# Patient Record
Sex: FEMALE | Race: ASIAN | NOT HISPANIC OR LATINO | URBAN - METROPOLITAN AREA
[De-identification: names, ages, dates, MRNs, and addresses within clinical notes are randomized per-mention and may not be internally consistent; named-entity substitution may affect disease eponyms.]

---

## 2019-06-16 ENCOUNTER — INPATIENT (INPATIENT)
Facility: HOSPITAL | Age: 34
LOS: 2 days | Discharge: HOME | End: 2019-06-19
Attending: OBSTETRICS & GYNECOLOGY | Admitting: OBSTETRICS & GYNECOLOGY
Payer: MEDICAID

## 2019-06-16 VITALS
TEMPERATURE: 97 F | HEART RATE: 67 BPM | RESPIRATION RATE: 18 BRPM | SYSTOLIC BLOOD PRESSURE: 151 MMHG | OXYGEN SATURATION: 100 % | DIASTOLIC BLOOD PRESSURE: 84 MMHG

## 2019-06-16 DIAGNOSIS — O26.899 OTHER SPECIFIED PREGNANCY RELATED CONDITIONS, UNSPECIFIED TRIMESTER: ICD-10-CM

## 2019-06-16 DIAGNOSIS — Z3A.00 WEEKS OF GESTATION OF PREGNANCY NOT SPECIFIED: ICD-10-CM

## 2019-06-16 DIAGNOSIS — Z34.80 ENCOUNTER FOR SUPERVISION OF OTHER NORMAL PREGNANCY, UNSPECIFIED TRIMESTER: ICD-10-CM

## 2019-06-16 LAB
ABO RH CONFIRMATION: SIGNIFICANT CHANGE UP
ALLERGY+IMMUNOLOGY DIAG STUDY NOTE: ABNORMAL
ALLERGY+IMMUNOLOGY DIAG STUDY NOTE: SIGNIFICANT CHANGE UP
ANISOCYTOSIS BLD QL: SLIGHT — SIGNIFICANT CHANGE UP
APTT BLD: 22.9 SEC — LOW (ref 27.5–36.3)
BASOPHILS # BLD AUTO: 0 K/UL — SIGNIFICANT CHANGE UP (ref 0–0.2)
BASOPHILS NFR BLD AUTO: 0 % — SIGNIFICANT CHANGE UP (ref 0–2)
DIR ANTIGLOB POLYSPECIFIC INTERPRETATION: SIGNIFICANT CHANGE UP
EOSINOPHIL # BLD AUTO: 0.16 K/UL — SIGNIFICANT CHANGE UP (ref 0–0.5)
EOSINOPHIL NFR BLD AUTO: 2 % — SIGNIFICANT CHANGE UP (ref 0–6)
FIBRINOGEN PPP-MCNC: 685 MG/DL — HIGH (ref 350–510)
HBV SURFACE AG SERPL QL IA: SIGNIFICANT CHANGE UP
HCT VFR BLD CALC: 34.3 % — LOW (ref 34.5–45)
HGB BLD-MCNC: 10.7 G/DL — LOW (ref 11.5–15.5)
INR BLD: 0.94 RATIO — SIGNIFICANT CHANGE UP (ref 0.88–1.16)
LYMPHOCYTES # BLD AUTO: 2.26 K/UL — SIGNIFICANT CHANGE UP (ref 1–3.3)
LYMPHOCYTES # BLD AUTO: 28 % — SIGNIFICANT CHANGE UP (ref 13–44)
MANUAL SMEAR VERIFICATION: SIGNIFICANT CHANGE UP
MCHC RBC-ENTMCNC: 25.4 PG — LOW (ref 27–34)
MCHC RBC-ENTMCNC: 31.2 GM/DL — LOW (ref 32–36)
MCV RBC AUTO: 81.5 FL — SIGNIFICANT CHANGE UP (ref 80–100)
MICROCYTES BLD QL: SLIGHT — SIGNIFICANT CHANGE UP
MONOCYTES # BLD AUTO: 0.16 K/UL — SIGNIFICANT CHANGE UP (ref 0–0.9)
MONOCYTES NFR BLD AUTO: 2 % — SIGNIFICANT CHANGE UP (ref 2–14)
NEUTROPHILS # BLD AUTO: 5.08 K/UL — SIGNIFICANT CHANGE UP (ref 1.8–7.4)
NEUTROPHILS NFR BLD AUTO: 63 % — SIGNIFICANT CHANGE UP (ref 43–77)
NRBC # BLD: 0 /100 — SIGNIFICANT CHANGE UP (ref 0–0)
OVALOCYTES BLD QL SMEAR: SLIGHT — SIGNIFICANT CHANGE UP
PLAT MORPH BLD: NORMAL — SIGNIFICANT CHANGE UP
PLATELET # BLD AUTO: 243 K/UL — SIGNIFICANT CHANGE UP (ref 150–400)
POIKILOCYTOSIS BLD QL AUTO: SLIGHT — SIGNIFICANT CHANGE UP
PROTHROM AB SERPL-ACNC: 10.4 SEC — SIGNIFICANT CHANGE UP (ref 10–12.9)
RBC # BLD: 4.21 M/UL — SIGNIFICANT CHANGE UP (ref 3.8–5.2)
RBC # FLD: 22.2 % — HIGH (ref 10.3–14.5)
RBC BLD AUTO: ABNORMAL
SPHEROCYTES BLD QL SMEAR: SLIGHT — SIGNIFICANT CHANGE UP
T PALLIDUM AB TITR SER: NEGATIVE — SIGNIFICANT CHANGE UP
VARIANT LYMPHS # BLD: 5 % — SIGNIFICANT CHANGE UP (ref 0–6)
WBC # BLD: 8.06 K/UL — SIGNIFICANT CHANGE UP (ref 3.8–10.5)
WBC # FLD AUTO: 8.06 K/UL — SIGNIFICANT CHANGE UP (ref 3.8–10.5)

## 2019-06-16 PROCEDURE — 59514 CESAREAN DELIVERY ONLY: CPT | Mod: AS,U7

## 2019-06-16 PROCEDURE — 99282 EMERGENCY DEPT VISIT SF MDM: CPT

## 2019-06-16 PROCEDURE — 88307 TISSUE EXAM BY PATHOLOGIST: CPT | Mod: 26

## 2019-06-16 RX ORDER — METOCLOPRAMIDE HCL 10 MG
10 TABLET ORAL ONCE
Refills: 0 | Status: DISCONTINUED | OUTPATIENT
Start: 2019-06-16 | End: 2019-06-16

## 2019-06-16 RX ORDER — DOCUSATE SODIUM 100 MG
100 CAPSULE ORAL
Refills: 0 | Status: DISCONTINUED | OUTPATIENT
Start: 2019-06-16 | End: 2019-06-19

## 2019-06-16 RX ORDER — OXYTOCIN 10 UNIT/ML
333.33 VIAL (ML) INJECTION
Qty: 20 | Refills: 0 | Status: DISCONTINUED | OUTPATIENT
Start: 2019-06-16 | End: 2019-06-19

## 2019-06-16 RX ORDER — SIMETHICONE 80 MG/1
80 TABLET, CHEWABLE ORAL EVERY 4 HOURS
Refills: 0 | Status: DISCONTINUED | OUTPATIENT
Start: 2019-06-16 | End: 2019-06-19

## 2019-06-16 RX ORDER — CITRIC ACID/SODIUM CITRATE 300-500 MG
30 SOLUTION, ORAL ORAL ONCE
Refills: 0 | Status: COMPLETED | OUTPATIENT
Start: 2019-06-16 | End: 2019-06-16

## 2019-06-16 RX ORDER — KETOROLAC TROMETHAMINE 30 MG/ML
30 SYRINGE (ML) INJECTION EVERY 6 HOURS
Refills: 0 | Status: DISCONTINUED | OUTPATIENT
Start: 2019-06-16 | End: 2019-06-17

## 2019-06-16 RX ORDER — HEPARIN SODIUM 5000 [USP'U]/ML
5000 INJECTION INTRAVENOUS; SUBCUTANEOUS EVERY 12 HOURS
Refills: 0 | Status: DISCONTINUED | OUTPATIENT
Start: 2019-06-17 | End: 2019-06-19

## 2019-06-16 RX ORDER — MAGNESIUM HYDROXIDE 400 MG/1
30 TABLET, CHEWABLE ORAL
Refills: 0 | Status: DISCONTINUED | OUTPATIENT
Start: 2019-06-16 | End: 2019-06-19

## 2019-06-16 RX ORDER — SODIUM CHLORIDE 9 MG/ML
1000 INJECTION, SOLUTION INTRAVENOUS
Refills: 0 | Status: DISCONTINUED | OUTPATIENT
Start: 2019-06-16 | End: 2019-06-17

## 2019-06-16 RX ORDER — LANOLIN
1 OINTMENT (GRAM) TOPICAL EVERY 6 HOURS
Refills: 0 | Status: DISCONTINUED | OUTPATIENT
Start: 2019-06-16 | End: 2019-06-19

## 2019-06-16 RX ORDER — ONDANSETRON 8 MG/1
4 TABLET, FILM COATED ORAL ONCE
Refills: 0 | Status: DISCONTINUED | OUTPATIENT
Start: 2019-06-16 | End: 2019-06-19

## 2019-06-16 RX ORDER — CEFAZOLIN SODIUM 1 G
2000 VIAL (EA) INJECTION ONCE
Refills: 0 | Status: COMPLETED | OUTPATIENT
Start: 2019-06-16 | End: 2019-06-16

## 2019-06-16 RX ORDER — SODIUM CHLORIDE 9 MG/ML
1000 INJECTION, SOLUTION INTRAVENOUS
Refills: 0 | Status: DISCONTINUED | OUTPATIENT
Start: 2019-06-16 | End: 2019-06-16

## 2019-06-16 RX ORDER — DIPHENHYDRAMINE HCL 50 MG
25 CAPSULE ORAL EVERY 6 HOURS
Refills: 0 | Status: DISCONTINUED | OUTPATIENT
Start: 2019-06-16 | End: 2019-06-19

## 2019-06-16 RX ORDER — GLYCERIN ADULT
1 SUPPOSITORY, RECTAL RECTAL AT BEDTIME
Refills: 0 | Status: DISCONTINUED | OUTPATIENT
Start: 2019-06-16 | End: 2019-06-19

## 2019-06-16 RX ORDER — SODIUM CHLORIDE 9 MG/ML
1000 INJECTION, SOLUTION INTRAVENOUS ONCE
Refills: 0 | Status: COMPLETED | OUTPATIENT
Start: 2019-06-16 | End: 2019-06-16

## 2019-06-16 RX ORDER — AZITHROMYCIN 500 MG/1
500 TABLET, FILM COATED ORAL ONCE
Refills: 0 | Status: COMPLETED | OUTPATIENT
Start: 2019-06-16 | End: 2019-06-16

## 2019-06-16 RX ORDER — TETANUS TOXOID, REDUCED DIPHTHERIA TOXOID AND ACELLULAR PERTUSSIS VACCINE, ADSORBED 5; 2.5; 8; 8; 2.5 [IU]/.5ML; [IU]/.5ML; UG/.5ML; UG/.5ML; UG/.5ML
0.5 SUSPENSION INTRAMUSCULAR ONCE
Refills: 0 | Status: DISCONTINUED | OUTPATIENT
Start: 2019-06-16 | End: 2019-06-19

## 2019-06-16 RX ORDER — ACETAMINOPHEN 500 MG
1000 TABLET ORAL ONCE
Refills: 0 | Status: COMPLETED | OUTPATIENT
Start: 2019-06-16 | End: 2019-06-17

## 2019-06-16 RX ADMIN — SODIUM CHLORIDE 125 MILLILITER(S): 9 INJECTION, SOLUTION INTRAVENOUS at 18:39

## 2019-06-16 RX ADMIN — Medication 1000 MILLIUNIT(S)/MIN: at 13:22

## 2019-06-16 RX ADMIN — SODIUM CHLORIDE 125 MILLILITER(S): 9 INJECTION, SOLUTION INTRAVENOUS at 10:00

## 2019-06-16 RX ADMIN — SODIUM CHLORIDE 2000 MILLILITER(S): 9 INJECTION, SOLUTION INTRAVENOUS at 09:00

## 2019-06-16 RX ADMIN — Medication 30 MILLILITER(S): at 11:22

## 2019-06-16 RX ADMIN — AZITHROMYCIN 250 MILLIGRAM(S): 500 TABLET, FILM COATED ORAL at 12:21

## 2019-06-16 RX ADMIN — Medication 100 MILLIGRAM(S): at 12:21

## 2019-06-16 NOTE — PATIENT PROFILE OB - ALERT: PERTINENT HISTORY
Fetal Sonogram/Ultra Screen at 12 Weeks/20 Week Level II Sonogram/BioPhysical Profile(s)/1st Trimester Sonogram/Follow up Sonogram for Growth

## 2019-06-16 NOTE — PROGRESS NOTE ADULT - SUBJECTIVE AND OBJECTIVE BOX
Post Op Note    Patient seen resting comfortably.  No new complaints.  Denies HA, CP, SOB, N/V/D, dizziness, palpitations, worsening abdominal pain, worsening vaginal bleeding, or any other concerns. Tolerating clear diet at this time.  Attempting breastfeeding.      Vital Signs Last 24 Hrs  T(C): 36.6 (2019 16:27), Max: 36.6 (2019 16:27)  T(F): 97.8 (2019 16:27), Max: 97.8 (2019 16:27)  HR: 69 (2019 16:27) (65 - 92)  BP: 93/60 (2019 16:27) (85/55 - 151/84)  RR: 18 (2019 16:27) (16 - 19)  SpO2: 97% (2019 16:27) (97% - 100%)    Gen: A&O x 3, NAD  Chest: CTABL  Cardiac: S1+S2+ RRR  Breast: Soft, nontender, nonengorged  Abdomen: Nontender, nondistended, +BS, Dressing C/D/I  Gyn: Minimal bleeding  Extremities: Nontender, DTRS 2+, no worsening edema, venodynes intact                          10.7   8.06  )-----------( 243      ( 2019 09:31 )             34.3       A/P:  Patient s/p  section, POD #0.     -Pain management as needed  -Advance as per protocol  -OOB and ambulate in am   -Repeat CBC in am  -DC li and trial of void in am  -Advance diet with flatus  -Encourage breastfeeding

## 2019-06-17 LAB
BASOPHILS # BLD AUTO: 0.03 K/UL — SIGNIFICANT CHANGE UP (ref 0–0.2)
BASOPHILS NFR BLD AUTO: 0.3 % — SIGNIFICANT CHANGE UP (ref 0–2)
EOSINOPHIL # BLD AUTO: 0.01 K/UL — SIGNIFICANT CHANGE UP (ref 0–0.5)
EOSINOPHIL NFR BLD AUTO: 0.1 % — SIGNIFICANT CHANGE UP (ref 0–6)
HCT VFR BLD CALC: 27.7 % — LOW (ref 34.5–45)
HGB BLD-MCNC: 8.8 G/DL — LOW (ref 11.5–15.5)
IMM GRANULOCYTES NFR BLD AUTO: 0.4 % — SIGNIFICANT CHANGE UP (ref 0–1.5)
LYMPHOCYTES # BLD AUTO: 1.07 K/UL — SIGNIFICANT CHANGE UP (ref 1–3.3)
LYMPHOCYTES # BLD AUTO: 9.5 % — LOW (ref 13–44)
MCHC RBC-ENTMCNC: 25.7 PG — LOW (ref 27–34)
MCHC RBC-ENTMCNC: 31.8 GM/DL — LOW (ref 32–36)
MCV RBC AUTO: 81 FL — SIGNIFICANT CHANGE UP (ref 80–100)
MONOCYTES # BLD AUTO: 0.64 K/UL — SIGNIFICANT CHANGE UP (ref 0–0.9)
MONOCYTES NFR BLD AUTO: 5.7 % — SIGNIFICANT CHANGE UP (ref 2–14)
NEUTROPHILS # BLD AUTO: 9.42 K/UL — HIGH (ref 1.8–7.4)
NEUTROPHILS NFR BLD AUTO: 84 % — HIGH (ref 43–77)
NRBC # BLD: 0 /100 WBCS — SIGNIFICANT CHANGE UP (ref 0–0)
PLATELET # BLD AUTO: 263 K/UL — SIGNIFICANT CHANGE UP (ref 150–400)
RBC # BLD: 3.42 M/UL — LOW (ref 3.8–5.2)
RBC # FLD: 22.2 % — HIGH (ref 10.3–14.5)
WBC # BLD: 11.21 K/UL — HIGH (ref 3.8–10.5)
WBC # FLD AUTO: 11.21 K/UL — HIGH (ref 3.8–10.5)

## 2019-06-17 RX ORDER — ACETAMINOPHEN 500 MG
975 TABLET ORAL EVERY 6 HOURS
Refills: 0 | Status: DISCONTINUED | OUTPATIENT
Start: 2019-06-17 | End: 2019-06-19

## 2019-06-17 RX ORDER — IBUPROFEN 200 MG
600 TABLET ORAL EVERY 6 HOURS
Refills: 0 | Status: DISCONTINUED | OUTPATIENT
Start: 2019-06-17 | End: 2019-06-17

## 2019-06-17 RX ORDER — FERROUS SULFATE 325(65) MG
325 TABLET ORAL
Refills: 0 | Status: DISCONTINUED | OUTPATIENT
Start: 2019-06-17 | End: 2019-06-19

## 2019-06-17 RX ORDER — IBUPROFEN 200 MG
600 TABLET ORAL EVERY 6 HOURS
Refills: 0 | Status: DISCONTINUED | OUTPATIENT
Start: 2019-06-17 | End: 2019-06-19

## 2019-06-17 RX ORDER — ASCORBIC ACID 60 MG
500 TABLET,CHEWABLE ORAL DAILY
Refills: 0 | Status: DISCONTINUED | OUTPATIENT
Start: 2019-06-17 | End: 2019-06-19

## 2019-06-17 RX ADMIN — Medication 600 MILLIGRAM(S): at 18:46

## 2019-06-17 RX ADMIN — Medication 975 MILLIGRAM(S): at 13:01

## 2019-06-17 RX ADMIN — HEPARIN SODIUM 5000 UNIT(S): 5000 INJECTION INTRAVENOUS; SUBCUTANEOUS at 12:31

## 2019-06-17 RX ADMIN — Medication 975 MILLIGRAM(S): at 18:17

## 2019-06-17 RX ADMIN — Medication 975 MILLIGRAM(S): at 18:47

## 2019-06-17 RX ADMIN — Medication 1 TABLET(S): at 12:30

## 2019-06-17 RX ADMIN — Medication 500 MILLIGRAM(S): at 12:30

## 2019-06-17 RX ADMIN — Medication 325 MILLIGRAM(S): at 18:17

## 2019-06-17 RX ADMIN — Medication 600 MILLIGRAM(S): at 12:30

## 2019-06-17 RX ADMIN — Medication 400 MILLIGRAM(S): at 05:45

## 2019-06-17 RX ADMIN — Medication 600 MILLIGRAM(S): at 18:16

## 2019-06-17 RX ADMIN — Medication 975 MILLIGRAM(S): at 12:31

## 2019-06-17 RX ADMIN — Medication 600 MILLIGRAM(S): at 01:30

## 2019-06-17 RX ADMIN — HEPARIN SODIUM 5000 UNIT(S): 5000 INJECTION INTRAVENOUS; SUBCUTANEOUS at 01:27

## 2019-06-17 NOTE — PROGRESS NOTE ADULT - SUBJECTIVE AND OBJECTIVE BOX
Patient seen at [facility] resting comfortably offers no new complaints. + Ambulation, voiding without difficulty, + flatus; tolerating regular diet. both breast and bottle feeding. Denies HA, CP, SOB, N/V/D,  no bm; dizziness, palpitations, worsening abdominal pain, worsening vaginal bleeding, or any other concerns.     Vital Signs Last 24 Hrs  T(C): 37.1 (17 Jun 2019 06:00), Max: 37.1 (17 Jun 2019 00:30)  T(F): 98.8 (17 Jun 2019 06:00), Max: 98.8 (17 Jun 2019 06:00)  HR: 73 (17 Jun 2019 06:00) (65 - 97)  BP: 95/62 (17 Jun 2019 06:00) (85/55 - 151/84)  BP(mean): --  RR: 18 (17 Jun 2019 06:00) (17 - 19)  SpO2: 96% (17 Jun 2019 06:00) (96% - 100%)    Gen: A&O x 3, NAD  Chest: CTA B/L  Cardiac: S1,S2  RRR  Breast: Soft, nontender, nonengorged  Abdomen: +BS; soft; Nontender, nondistended; dressing removed incision C/D/I  Gyn: minimal lochia   Extremities: Nontender, no worsening edema;                           10.7   8.06  )-----------( 243      ( 16 Jun 2019 09:31 )             34.3       A/P: POD #1 s/p c/s  --Pain management as needed  -Advance as per protocol  -OOB and ambulate  -f/u Rpt CBC   -DC li f/u void  -Advance diet with flatus  -Encourage breastfeeding   -d/w rell aguilar

## 2019-06-17 NOTE — PROGRESS NOTE ADULT - PROBLEM SELECTOR PLAN 1
A/P: POD #1 s/p c/s  --Pain management as needed  -Advance as per protocol  -OOB and ambulate  -f/u Rpt CBC   -DC li f/u void  -Advance diet with flatus  -Encourage breastfeeding   -d/w rell aguilar

## 2019-06-18 DIAGNOSIS — D64.9 ANEMIA, UNSPECIFIED: ICD-10-CM

## 2019-06-18 LAB
BASOPHILS # BLD AUTO: 0.03 K/UL — SIGNIFICANT CHANGE UP (ref 0–0.2)
BASOPHILS NFR BLD AUTO: 0.2 % — SIGNIFICANT CHANGE UP (ref 0–2)
EOSINOPHIL # BLD AUTO: 0.06 K/UL — SIGNIFICANT CHANGE UP (ref 0–0.5)
EOSINOPHIL NFR BLD AUTO: 0.5 % — SIGNIFICANT CHANGE UP (ref 0–6)
HCT VFR BLD CALC: 27.5 % — LOW (ref 34.5–45)
HGB BLD-MCNC: 8.4 G/DL — LOW (ref 11.5–15.5)
IMM GRANULOCYTES NFR BLD AUTO: 0.4 % — SIGNIFICANT CHANGE UP (ref 0–1.5)
LYMPHOCYTES # BLD AUTO: 1.75 K/UL — SIGNIFICANT CHANGE UP (ref 1–3.3)
LYMPHOCYTES # BLD AUTO: 14.1 % — SIGNIFICANT CHANGE UP (ref 13–44)
MCHC RBC-ENTMCNC: 25.1 PG — LOW (ref 27–34)
MCHC RBC-ENTMCNC: 30.5 GM/DL — LOW (ref 32–36)
MCV RBC AUTO: 82.3 FL — SIGNIFICANT CHANGE UP (ref 80–100)
MONOCYTES # BLD AUTO: 0.97 K/UL — HIGH (ref 0–0.9)
MONOCYTES NFR BLD AUTO: 7.8 % — SIGNIFICANT CHANGE UP (ref 2–14)
NEUTROPHILS # BLD AUTO: 9.53 K/UL — HIGH (ref 1.8–7.4)
NEUTROPHILS NFR BLD AUTO: 77 % — SIGNIFICANT CHANGE UP (ref 43–77)
NRBC # BLD: 0 /100 WBCS — SIGNIFICANT CHANGE UP (ref 0–0)
PLATELET # BLD AUTO: 273 K/UL — SIGNIFICANT CHANGE UP (ref 150–400)
RBC # BLD: 3.34 M/UL — LOW (ref 3.8–5.2)
RBC # FLD: 22.9 % — HIGH (ref 10.3–14.5)
WBC # BLD: 12.39 K/UL — HIGH (ref 3.8–10.5)
WBC # FLD AUTO: 12.39 K/UL — HIGH (ref 3.8–10.5)

## 2019-06-18 RX ORDER — FOLIC ACID 0.8 MG
1 TABLET ORAL DAILY
Refills: 0 | Status: DISCONTINUED | OUTPATIENT
Start: 2019-06-18 | End: 2019-06-19

## 2019-06-18 RX ADMIN — Medication 500 MILLIGRAM(S): at 12:26

## 2019-06-18 RX ADMIN — Medication 975 MILLIGRAM(S): at 06:06

## 2019-06-18 RX ADMIN — MAGNESIUM HYDROXIDE 30 MILLILITER(S): 400 TABLET, CHEWABLE ORAL at 18:52

## 2019-06-18 RX ADMIN — Medication 100 MILLIGRAM(S): at 18:52

## 2019-06-18 RX ADMIN — Medication 600 MILLIGRAM(S): at 12:47

## 2019-06-18 RX ADMIN — Medication 975 MILLIGRAM(S): at 23:56

## 2019-06-18 RX ADMIN — Medication 975 MILLIGRAM(S): at 19:11

## 2019-06-18 RX ADMIN — Medication 975 MILLIGRAM(S): at 00:17

## 2019-06-18 RX ADMIN — Medication 975 MILLIGRAM(S): at 18:52

## 2019-06-18 RX ADMIN — Medication 975 MILLIGRAM(S): at 12:26

## 2019-06-18 RX ADMIN — Medication 975 MILLIGRAM(S): at 12:46

## 2019-06-18 RX ADMIN — HEPARIN SODIUM 5000 UNIT(S): 5000 INJECTION INTRAVENOUS; SUBCUTANEOUS at 18:52

## 2019-06-18 RX ADMIN — Medication 1 TABLET(S): at 12:26

## 2019-06-18 RX ADMIN — Medication 600 MILLIGRAM(S): at 12:26

## 2019-06-18 RX ADMIN — Medication 600 MILLIGRAM(S): at 06:06

## 2019-06-18 RX ADMIN — Medication 600 MILLIGRAM(S): at 00:17

## 2019-06-18 RX ADMIN — Medication 600 MILLIGRAM(S): at 23:56

## 2019-06-18 RX ADMIN — Medication 325 MILLIGRAM(S): at 18:52

## 2019-06-18 RX ADMIN — Medication 1 MILLIGRAM(S): at 12:26

## 2019-06-18 RX ADMIN — Medication 600 MILLIGRAM(S): at 19:11

## 2019-06-18 RX ADMIN — SIMETHICONE 80 MILLIGRAM(S): 80 TABLET, CHEWABLE ORAL at 23:56

## 2019-06-18 RX ADMIN — Medication 325 MILLIGRAM(S): at 06:05

## 2019-06-18 RX ADMIN — Medication 975 MILLIGRAM(S): at 01:17

## 2019-06-18 RX ADMIN — Medication 600 MILLIGRAM(S): at 01:17

## 2019-06-18 RX ADMIN — Medication 600 MILLIGRAM(S): at 18:52

## 2019-06-18 RX ADMIN — HEPARIN SODIUM 5000 UNIT(S): 5000 INJECTION INTRAVENOUS; SUBCUTANEOUS at 01:17

## 2019-06-18 NOTE — PROGRESS NOTE ADULT - SUBJECTIVE AND OBJECTIVE BOX
Patient seen at bedside resting comfortably offers no new complaints. + Ambulation, + void without difficulty, + flatus;  no bm; tolerating regular diet. both breastfeeding and bottle feeding. Denies HA, blurry vision or epigastric pain, CP, SOB, N/V/D,  dizziness, palpitations, worsening vaginal bleeding.    Vital Signs Last 24 Hrs  T(C): 37.1 (18 Jun 2019 06:12), Max: 37.1 (18 Jun 2019 06:12)  T(F): 98.7 (18 Jun 2019 06:12), Max: 98.7 (18 Jun 2019 06:12)  HR: 64 (18 Jun 2019 06:12) (64 - 83)  BP: 99/66 (18 Jun 2019 06:12) (91/59 - 99/69)  BP(mean): --  RR: 16 (18 Jun 2019 06:12) (16 - 17)  SpO2: 98% (18 Jun 2019 06:12) (98% - 100%)    Gen: A&O x 3, NAD  Chest: CTABL  Cardiac: S1, S2, RRR  Breast: Soft, nontender, nonengorged  Abdomen: +BS; soft; Nontender, nondistended, Incision C/D/I steri strips in place   Gyn: Minimal lochia  Extremities: Nontender, DTRS 2+, no worsening edema                          8.4    12.39 )-----------( 273      ( 18 Jun 2019 06:54 )             27.5

## 2019-06-18 NOTE — DISCHARGE NOTE OB - CARE PROVIDER_API CALL
Kandis Lovett)  Obstetrics and Gynecology  [address] [place] [address]  Phone: [phone]  Fax: [phone]  Follow Up Time:

## 2019-06-18 NOTE — PROGRESS NOTE ADULT - PROBLEM SELECTOR PLAN 2
-Pain management as needed  -cont post op care  -OOB and ambulate  -f/u Rpt CBC   -encourage insentive spirometer use  -Encourage breastfeeding   -feso4/vitC/Folic acid  -d/w dr Fall attending on call

## 2019-06-18 NOTE — DISCHARGE NOTE OB - PATIENT PORTAL LINK FT
You can access the [facility], offered by [facility], by registering with the following website: [email or web address]

## 2019-06-18 NOTE — DISCHARGE NOTE OB - CARE PLAN
Principal Discharge DX:	 delivery delivered  Goal:	pain management and breastfeeding  Assessment and plan of treatment:	Continue breastfeeding.  Motrin as needed for pain.  Ambulate daily.  No heavy lifting or anything per vagina x 6 weeks - no sex, tampons, douching, tub baths, etc.  Follow up in office in 2 weeks for incision check, and then at 6 weeks for postpartum check.  Secondary Diagnosis:	Acute on chronic anemia  Goal:	Improve blood counts  Assessment and plan of treatment:	Take iron as directed.  Eat iron rich diet. Principal Discharge DX:	 delivery delivered  Goal:	pain management and breastfeeding  Assessment and plan of treatment:	Continue breastfeeding.  Motrin as needed for pain.  Ambulate daily.  No heavy lifting or anything per vagina x 6 weeks - no sex, tampons, douching, tub baths, etc.  Follow up in office in 2 weeks for incision check, and then at 6 weeks for postpartum check.  Secondary Diagnosis:	Acute on chronic anemia  Goal:	Take iron, folic acid, vitamin C, and prenatal vitamins. Eat iron fortified foods.

## 2019-06-18 NOTE — DISCHARGE NOTE OB - HOSPITAL COURSE
Patient presents in early labor with previous c/s at 38w5d.  S/p uncomplicated procedure and routine post op. Given iron supplementation for acute on chronic anemia.  Discharged home in stable condition.

## 2019-06-18 NOTE — DISCHARGE NOTE OB - MEDICATION SUMMARY - MEDICATIONS TO TAKE
I will START or STAY ON the medications listed below when I get home from the hospital:    ibuprofen 600 mg oral tablet  -- 1 tab(s) by mouth every 6 hours, As Needed   -- Indication: For pain    ferrous sulfate 325 mg (65 mg elemental iron) oral tablet  -- 1 tab(s) by mouth 2 times a day   -- Check with your doctor before becoming pregnant.  Do not chew, break, or crush.  May discolor urine or feces.    -- Indication: For Anemia    Colace 100 mg oral capsule  -- 1 cap(s) by mouth 2 times a day, As Needed   -- Medication should be taken with plenty of water.    -- Indication: For Constipation

## 2019-06-18 NOTE — DISCHARGE NOTE OB - PLAN OF CARE
pain management and breastfeeding Continue breastfeeding.  Motrin as needed for pain.  Ambulate daily.  No heavy lifting or anything per vagina x 6 weeks - no sex, tampons, douching, tub baths, etc.  Follow up in office in 2 weeks for incision check, and then at 6 weeks for postpartum check. Improve blood counts Take iron as directed.  Eat iron rich diet. Take iron, folic acid, vitamin C, and prenatal vitamins. Eat iron fortified foods.

## 2019-06-18 NOTE — DISCHARGE NOTE OB - MATERIALS PROVIDED
Breastfeeding Log/Breastfeeding Guide and Packet/Guide to Postpartum Care/Birth Certificate Instructions/[place]  Immunization Record/Shaken Baby Prevention Handout/[facility] [place] Screening Program/Bottle Feeding Log

## 2019-06-19 VITALS
HEART RATE: 69 BPM | TEMPERATURE: 98 F | SYSTOLIC BLOOD PRESSURE: 110 MMHG | RESPIRATION RATE: 16 BRPM | DIASTOLIC BLOOD PRESSURE: 76 MMHG | OXYGEN SATURATION: 99 %

## 2019-06-19 LAB
HCT VFR BLD CALC: 27.5 % — LOW (ref 34.5–45)
HGB BLD-MCNC: 8.4 G/DL — LOW (ref 11.5–15.5)
MCHC RBC-ENTMCNC: 25.3 PG — LOW (ref 27–34)
MCHC RBC-ENTMCNC: 30.5 GM/DL — LOW (ref 32–36)
MCV RBC AUTO: 82.8 FL — SIGNIFICANT CHANGE UP (ref 80–100)
NRBC # BLD: 0 /100 WBCS — SIGNIFICANT CHANGE UP (ref 0–0)
PLATELET # BLD AUTO: 313 K/UL — SIGNIFICANT CHANGE UP (ref 150–400)
RBC # BLD: 3.32 M/UL — LOW (ref 3.8–5.2)
RBC # FLD: 22.7 % — HIGH (ref 10.3–14.5)
WBC # BLD: 9.59 K/UL — SIGNIFICANT CHANGE UP (ref 3.8–10.5)
WBC # FLD AUTO: 9.59 K/UL — SIGNIFICANT CHANGE UP (ref 3.8–10.5)

## 2019-06-19 RX ORDER — FERROUS SULFATE 325(65) MG
1 TABLET ORAL
Qty: 60 | Refills: 0
Start: 2019-06-19 | End: 2019-07-18

## 2019-06-19 RX ORDER — DOCUSATE SODIUM 100 MG
1 CAPSULE ORAL
Qty: 60 | Refills: 0
Start: 2019-06-19 | End: 2019-07-18

## 2019-06-19 RX ORDER — IBUPROFEN 200 MG
1 TABLET ORAL
Qty: 20 | Refills: 0
Start: 2019-06-19 | End: 2019-06-23

## 2019-06-19 RX ADMIN — Medication 100 MILLIGRAM(S): at 05:43

## 2019-06-19 RX ADMIN — Medication 975 MILLIGRAM(S): at 05:43

## 2019-06-19 RX ADMIN — SIMETHICONE 80 MILLIGRAM(S): 80 TABLET, CHEWABLE ORAL at 05:43

## 2019-06-19 RX ADMIN — Medication 600 MILLIGRAM(S): at 00:30

## 2019-06-19 RX ADMIN — Medication 325 MILLIGRAM(S): at 05:43

## 2019-06-19 RX ADMIN — Medication 600 MILLIGRAM(S): at 05:43

## 2019-06-19 RX ADMIN — Medication 975 MILLIGRAM(S): at 00:30

## 2019-06-19 RX ADMIN — Medication 975 MILLIGRAM(S): at 06:34

## 2019-06-19 RX ADMIN — Medication 600 MILLIGRAM(S): at 06:34

## 2019-06-19 RX ADMIN — HEPARIN SODIUM 5000 UNIT(S): 5000 INJECTION INTRAVENOUS; SUBCUTANEOUS at 06:33

## 2019-06-19 NOTE — PROGRESS NOTE ADULT - SUBJECTIVE AND OBJECTIVE BOX
Patient seen at bedside resting comfortably offers no new complaints. +Ambulation, + void without difficulty, + flatus and BM. Tolerating regular diet. Both breastfeeding and bottle feeding. Denies CP, SOB, N/V/D,  dizziness, palpitations, worsening abdominal pain, worsening vaginal bleeding, or any other concerns.     Vital Signs Last 24 Hrs  T(C): 36.7 (19 Jun 2019 06:49), Max: 36.9 (18 Jun 2019 14:31)  T(F): 98.1 (19 Jun 2019 06:49), Max: 98.4 (18 Jun 2019 14:31)  HR: 69 (19 Jun 2019 06:49) (69 - 93)  BP: 109/66 (19 Jun 2019 06:49) (91/58 - 109/66)  BP(mean): --  RR: 16 (19 Jun 2019 06:49) (16 - 16)  SpO2: 98% (19 Jun 2019 06:49) (97% - 98%)    Gen: A&O x 3, NAD  Chest: CTA B/L  Cardiac: S1,S2  RRR  Abdomen: +BS; soft; Nontender, nondistended, Incision C/D/I steri strips in place   Gyn: Minimal lochia  Extremities: Nontender, no worsening edema                          8.4    9.59  )-----------( 313      ( 19 Jun 2019 06:48 )             27.5

## 2019-06-19 NOTE — PROGRESS NOTE ADULT - PROBLEM SELECTOR PLAN 1
-d/c home   -instructions verbalized  -follow up in 1-2weeks in office  -ibuprofen for pain as needed  -Continue Iron/Colace for anemia   -Dr. Olanescu aware.

## 2019-07-10 PROCEDURE — 85730 THROMBOPLASTIN TIME PARTIAL: CPT

## 2019-07-10 PROCEDURE — 86922 COMPATIBILITY TEST ANTIGLOB: CPT

## 2019-07-10 PROCEDURE — 85384 FIBRINOGEN ACTIVITY: CPT

## 2019-07-10 PROCEDURE — 86850 RBC ANTIBODY SCREEN: CPT

## 2019-07-10 PROCEDURE — 87340 HEPATITIS B SURFACE AG IA: CPT

## 2019-07-10 PROCEDURE — 86780 TREPONEMA PALLIDUM: CPT

## 2019-07-10 PROCEDURE — 59025 FETAL NON-STRESS TEST: CPT

## 2019-07-10 PROCEDURE — 85610 PROTHROMBIN TIME: CPT

## 2019-07-10 PROCEDURE — 86900 BLOOD TYPING SEROLOGIC ABO: CPT

## 2019-07-10 PROCEDURE — G0463: CPT

## 2019-07-10 PROCEDURE — 86880 COOMBS TEST DIRECT: CPT

## 2019-07-10 PROCEDURE — 86901 BLOOD TYPING SEROLOGIC RH(D): CPT

## 2019-07-10 PROCEDURE — 88307 TISSUE EXAM BY PATHOLOGIST: CPT

## 2019-07-10 PROCEDURE — 59050 FETAL MONITOR W/REPORT: CPT

## 2019-07-10 PROCEDURE — 86870 RBC ANTIBODY IDENTIFICATION: CPT

## 2019-07-10 PROCEDURE — 36415 COLL VENOUS BLD VENIPUNCTURE: CPT

## 2019-07-10 PROCEDURE — 85027 COMPLETE CBC AUTOMATED: CPT

## 2021-03-10 NOTE — DISCHARGE NOTE OB - CONDITION (STATED IN TERMS THAT PERMIT A SPECIFIC MEASURABLE COMPARISON WITH CONDITION ON ADMISSION):
Patient   To   Sign  AMA for  Ambulance  Will  Go to [facility]  ER  For  Further  evaluation  
stable